# Patient Record
Sex: FEMALE | Race: WHITE | Employment: OTHER | URBAN - METROPOLITAN AREA
[De-identification: names, ages, dates, MRNs, and addresses within clinical notes are randomized per-mention and may not be internally consistent; named-entity substitution may affect disease eponyms.]

---

## 2018-10-27 ENCOUNTER — OFFICE VISIT (OUTPATIENT)
Dept: URGENT CARE | Facility: RETAIL CLINIC | Age: 38
End: 2018-10-27
Payer: COMMERCIAL

## 2018-10-27 VITALS
DIASTOLIC BLOOD PRESSURE: 66 MMHG | OXYGEN SATURATION: 95 % | HEART RATE: 84 BPM | TEMPERATURE: 98.4 F | SYSTOLIC BLOOD PRESSURE: 98 MMHG

## 2018-10-27 DIAGNOSIS — J45.31 MILD PERSISTENT ASTHMA WITH EXACERBATION: Primary | ICD-10-CM

## 2018-10-27 DIAGNOSIS — J30.2 SEASONAL ALLERGIC RHINITIS, UNSPECIFIED TRIGGER: ICD-10-CM

## 2018-10-27 PROCEDURE — 99203 OFFICE O/P NEW LOW 30 MIN: CPT | Performed by: PHYSICIAN ASSISTANT

## 2018-10-27 RX ORDER — ALBUTEROL SULFATE 0.83 MG/ML
2.5 SOLUTION RESPIRATORY (INHALATION) EVERY 4 HOURS PRN
Qty: 25 VIAL | Refills: 0 | Status: SHIPPED | OUTPATIENT
Start: 2018-10-27

## 2018-10-27 RX ORDER — PREDNISONE 20 MG/1
40 TABLET ORAL DAILY
Qty: 10 TABLET | Refills: 0 | Status: SHIPPED | OUTPATIENT
Start: 2018-10-27 | End: 2019-02-10

## 2018-10-27 RX ORDER — ALBUTEROL SULFATE 90 UG/1
2 AEROSOL, METERED RESPIRATORY (INHALATION) EVERY 4 HOURS PRN
Qty: 1 INHALER | Refills: 0 | Status: SHIPPED | OUTPATIENT
Start: 2018-10-27

## 2018-10-27 NOTE — PROGRESS NOTES
Chief Complaint   Patient presents with     Asthma     began 3 weeks ago with cough, sneezing, congestion; breathing is labored, coughing; pt describes as seasonal asthma, needs albuterol and symbicort     Sinus Problem     3 weeks     SUBJECTIVE:  Michell Kamara is a 38 year old female who presents to the clinic today with a chief complaint of cough, shortness of breath. and wheezing for 3 weeks. She has asthma and bronchiectasis and uses Advair daily and Albuterol as needed. Recently she has been needing her albuterol several times a day.   Her cough is described as wheezing.  She states her cough is worsening but the sinus pressure has been intermittent, waxing and waning.  Associated symptoms include sneezing, post nasal drip.  The patient's symptoms are exacerbated by no particular triggers.  Patient has been using old left over Amoxicillin for the last 2 days to improve symptoms.  Predisposing factors include: seasonal allergies and HX of asthma.    Past Medical History:   Diagnosis Date     Asthma      Sinusitis      Current Outpatient Prescriptions   Medication Sig Dispense Refill     albuterol (2.5 MG/3ML) 0.083% neb solution Take 1 vial (2.5 mg) by nebulization every 4 hours as needed for shortness of breath / dyspnea or wheezing 25 vial 0     albuterol (PROAIR HFA/PROVENTIL HFA/VENTOLIN HFA) 108 (90 Base) MCG/ACT inhaler Inhale 2 puffs into the lungs every 4 hours as needed for shortness of breath / dyspnea or wheezing 1 Inhaler 0     ALBUTEROL IN Inhale  into the lungs at onset of headache. UNKNOWN DOSE          Ascorbic Acid (VITAMIN C PO) Take 500 mg by mouth daily.         Budesonide-Formoterol Fumarate (SYMBICORT IN)        FISH OIL Take  by mouth daily. Take 2000mg - 4000 mg prn        Mometasone Furoate (NASONEX NA)        Multiple Vitamin (MULTIVITAMIN OR) Take 1 tablet by mouth daily.         predniSONE (DELTASONE) 20 MG tablet Take 2 tablets (40 mg) by mouth daily for 5 days 10 tablet 0      UNABLE TO FIND MEDICATION NAME: Tumeric       VITAMIN D, CHOLECALCIFEROL, PO Take by mouth daily       oxyCODONE-acetaminophen (PERCOCET) 5-325 MG per tablet Take 1-2 tablets by mouth every 4 hours as needed for pain. (Patient not taking: Reported on 10/27/2018) 15 tablet 0     Social History   Substance Use Topics     Smoking status: Never Smoker     Smokeless tobacco: Not on file     Alcohol use Yes      Comment: rare     Allergies   Allergen Reactions     Seasonal Allergies      REVIEW OF SYSTEMS  General: NEGATIVE for fever, chills, headaches, malaise.  ENT: POSITIVE for some nasal congestion, sneezing. NEGATIVE for sore throat, ear pain, .  Resp: POSITIVE for cough, wheezing and SOB. NEGATIVE for sputum and hemoptysis.    OBJECTIVE:  BP 98/66 (BP Location: Left arm)  Pulse 84  Temp 98.4  F (36.9  C) (Oral)  SpO2 95%  GENERAL APPEARANCE: healthy, alert and in no distress  HEENT: PERRL, conjunctiva clear. Bilateral ear canals and TM's normal. Nose without erythematous or edematous turbinates. Posterior pharynx nonerythematous and without tonsillar hypertrophy or exudate.  NECK: supple, nontender, no lymphadenopathy  RESP: lungs clear to auscultation - no rales, rhonchi or wheezes. Breathing is comfortable, not labored and without use of accessory muscles.  CV: regular rates and rhythm, normal S1 S2, no murmur noted    ASSESSMENT:    ICD-10-CM    1. Mild persistent asthma with exacerbation J45.31 predniSONE (DELTASONE) 20 MG tablet     albuterol (PROAIR HFA/PROVENTIL HFA/VENTOLIN HFA) 108 (90 Base) MCG/ACT inhaler     albuterol (2.5 MG/3ML) 0.083% neb solution   2. Seasonal allergic rhinitis, unspecified trigger J30.2 predniSONE (DELTASONE) 20 MG tablet     albuterol (2.5 MG/3ML) 0.083% neb solution     PLAN:   Patient Instructions   Prednisone 40mg daily for 5 days.  Continue Nasonex nasal spray, 2 sprays in each nostril daily.  Use your Symbicort twice daily every day, even when you are feeling well.  No  indication for antibiotics discussed.   Take an antihistamine such as Claritin (loratadine), Zyrtec (cetirizine) or Allegra (fexofenadine) daily for allergy symptoms.  Drink plenty of fluids (warm fluids like tea or soup are soothing and reduce cough)  Honey may soothe your sore throat and help manage your cough- may take straight or in warm water with lemon juice.  Avoid smoke (cigarettes, bonfires, fireplace, wood burning stoves).  Take Tylenol or an NSAID such as ibuprofen or naproxen as needed for pain.  Delsym (dextromethorphan polistirex) is an over the counter cough medication that lasts 12 hours.   Mucinex or Robitussin (guiafenesin) thin mucus and may help it to loosen more quickly  Good handwashing is the best way to prevent spread of germs  Present to emergency room if you develop trouble breathing, swallowing or cough-up blood.  Follow up with your primary care provider if symptoms worsen or fail to improve as expected.  Follow up with primary care provider with any problems, questions or concerns or if symptoms worsen or fail to improve. Patient agreed to plan and verbalized understanding.    DOMITILA Crisostomo - [place]

## 2018-10-27 NOTE — PATIENT INSTRUCTIONS
Prednisone 40mg daily for 5 days.  Continue Nasonex nasal spray, 2 sprays in each nostril daily.  Use your Symbicort twice daily every day, even when you are feeling well.  No indication for antibiotics discussed.   Take an antihistamine such as Claritin (loratadine), Zyrtec (cetirizine) or Allegra (fexofenadine) daily for allergy symptoms.  Drink plenty of fluids (warm fluids like tea or soup are soothing and reduce cough)  Honey may soothe your sore throat and help manage your cough- may take straight or in warm water with lemon juice.  Avoid smoke (cigarettes, bonfires, fireplace, wood burning stoves).  Take Tylenol or an NSAID such as ibuprofen or naproxen as needed for pain.  Delsym (dextromethorphan polistirex) is an over the counter cough medication that lasts 12 hours.   Mucinex or Robitussin (guiafenesin) thin mucus and may help it to loosen more quickly  Good handwashing is the best way to prevent spread of germs  Present to emergency room if you develop trouble breathing, swallowing or cough-up blood.  Follow up with your primary care provider if symptoms worsen or fail to improve as expected.

## 2018-10-27 NOTE — MR AVS SNAPSHOT
After Visit Summary   10/27/2018    Michell Kamara    MRN: [id number]           Patient Information     Date Of Birth          1980        Visit Information        Provider Department      10/27/2018 1:40 PM Olivia Louis PA-C [facility]        Today's Diagnoses     Mild persistent asthma with exacerbation    -  1    Seasonal allergic rhinitis, unspecified trigger          Care Instructions    Prednisone 40mg daily for 5 days.  Continue Nasonex nasal spray, 2 sprays in each nostril daily.  Use your Symbicort twice daily every day, even when you are feeling well.  No indication for antibiotics discussed.   Take an antihistamine such as Claritin (loratadine), Zyrtec (cetirizine) or Allegra (fexofenadine) daily for allergy symptoms.  Drink plenty of fluids (warm fluids like tea or soup are soothing and reduce cough)  Honey may soothe your sore throat and help manage your cough- may take straight or in warm water with lemon juice.  Avoid smoke (cigarettes, bonfires, fireplace, wood burning stoves).  Take Tylenol or an NSAID such as ibuprofen or naproxen as needed for pain.  Delsym (dextromethorphan polistirex) is an over the counter cough medication that lasts 12 hours.   Mucinex or Robitussin (guiafenesin) thin mucus and may help it to loosen more quickly  Good handwashing is the best way to prevent spread of germs  Present to emergency room if you develop trouble breathing, swallowing or cough-up blood.  Follow up with your primary care provider if symptoms worsen or fail to improve as expected.          Follow-ups after your visit        Who to contact     You can reach your care team any time of the day by calling [phone].  Notification of test results:  If you have an abnormal lab result, we will notify you by phone as soon as possible.         Additional Information About Your Visit        MyChart Information     [employer] lets you send messages to your doctor,  "view your test results, renew your prescriptions, schedule appointments and more. To sign up, go to [email or web address] . Click on \"Log in\" on the left side of the screen, which will take you to the Welcome page. Then click on \"Sign up Now\" on the right side of the page.     You will be asked to enter the access code listed below, as well as some personal information. Please follow the directions to create your username and password.     Your access code is: [email or web address]  Expires: 2019  2:22 PM     Your access code will  in 90 days. If you need help or a new code, please call your [place] clinic or [phone].        Care EveryWhere ID     This is your Care EveryWhere ID. This could be used by other organizations to access your [place] medical records  [id number]        Your Vitals Were     Pulse Temperature Pulse Oximetry             84 98.4  F (36.9  C) (Oral) 95%          Blood Pressure from Last 3 Encounters:   10/27/18 98/66   10/12/12 109/69   11 109/79    Weight from Last 3 Encounters:   10/12/12 145 lb 12.8 oz (66.1 kg)              Today, you had the following     No orders found for display         Today's Medication Changes          These changes are accurate as of 10/27/18  2:22 PM.  If you have any questions, ask your nurse or doctor.               Start taking these medicines.        Dose/Directions    * albuterol 108 (90 Base) MCG/ACT inhaler   Commonly known as:  PROAIR HFA/PROVENTIL HFA/VENTOLIN HFA   Used for:  Mild persistent asthma with exacerbation        Dose:  2 puff   Inhale 2 puffs into the lungs every 4 hours as needed for shortness of breath / dyspnea or wheezing   Quantity:  1 Inhaler   Refills:  0       * albuterol (2.5 MG/3ML) 0.083% neb solution   Used for:  Mild persistent asthma with exacerbation, Seasonal allergic rhinitis, unspecified trigger        Dose:  2.5 mg   Take 1 vial (2.5 mg) by nebulization every 4 hours as needed for shortness of breath / " dyspnea or wheezing   Quantity:  25 vial   Refills:  0       predniSONE 20 MG tablet   Commonly known as:  DELTASONE   Used for:  Mild persistent asthma with exacerbation, Seasonal allergic rhinitis, unspecified trigger        Dose:  40 mg   Take 2 tablets (40 mg) by mouth daily for 5 days   Quantity:  10 tablet   Refills:  0       * Notice:  This list has 2 medication(s) that are the same as other medications prescribed for you. Read the directions carefully, and ask your doctor or other care provider to review them with you.      Stop taking these medicines if you haven't already. Please contact your care team if you have questions.     ROMEO DAS IN                Where to get your medicines      These medications were sent to [facility] [address] [place] [address] [facility] AT [facility] [address]  [address] [facility], [facility] [address]     Phone:  [phone]     albuterol (2.5 MG/3ML) 0.083% neb solution    albuterol 108 (90 Base) MCG/ACT inhaler    predniSONE 20 MG tablet                Primary Care Provider Office Phone # Fax #    Venus Taylor, [facility] [phone]       Formerly [facility] [address]   [facility] [address]        Equal Access to Services     LEANNA SAM AH: Hadii ashley jenkins hadasho Soomaali, waaxda luqadaha, qaybta kaalmada adeegyada, anirudh garcia. So [facility] [phone].    ATENCIÓN: Si habla español, tiene a alicea disposición servicios gratuitos de asistencia lingüística. Jagjit al [phone].    We comply with applicable federal civil rights laws and [place] laws. We do not discriminate on the basis of race, color, national origin, age, disability, sex, sexual orientation, or gender identity.            Thank you!     Thank you for choosing [facility]  for your care. Our goal is always to provide you with excellent care. Hearing back from our patients is one way we can continue to improve  our services. Please take a few minutes to complete the written survey that you may receive in the mail after your visit with us. Thank you!             Your Updated Medication List - Protect others around you: Learn how to safely use, store and throw away your medicines at www.disposemymeds.org.          This list is accurate as of 10/27/18  2:22 PM.  Always use your most recent med list.                   Brand Name Dispense Instructions for use Diagnosis    * albuterol 108 (90 Base) MCG/ACT inhaler    PROAIR HFA/PROVENTIL HFA/VENTOLIN HFA    1 Inhaler    Inhale 2 puffs into the lungs every 4 hours as needed for shortness of breath / dyspnea or wheezing    Mild persistent asthma with exacerbation       * albuterol (2.5 MG/3ML) 0.083% neb solution     25 vial    Take 1 vial (2.5 mg) by nebulization every 4 hours as needed for shortness of breath / dyspnea or wheezing    Mild persistent asthma with exacerbation, Seasonal allergic rhinitis, unspecified trigger       ALBUTEROL IN      Inhale  into the lungs at onset of headache. UNKNOWN DOSE        FISH OIL      Take  by mouth daily. Take 2000mg - 4000 mg prn        MULTIVITAMIN PO      Take 1 tablet by mouth daily.        NASONEX NA           oxyCODONE-acetaminophen 5-325 MG per tablet    PERCOCET    15 tablet    Take 1-2 tablets by mouth every 4 hours as needed for pain.    S/P sinus surgery       predniSONE 20 MG tablet    DELTASONE    10 tablet    Take 2 tablets (40 mg) by mouth daily for 5 days    Mild persistent asthma with exacerbation, Seasonal allergic rhinitis, unspecified trigger       SYMBICORT IN           UNABLE TO FIND      MEDICATION NAME: Tumeric        VITAMIN C PO      Take 500 mg by mouth daily.        VITAMIN D (CHOLECALCIFEROL) PO      Take by mouth daily        * Notice:  This list has 2 medication(s) that are the same as other medications prescribed for you. Read the directions carefully, and ask your doctor or other care provider to review  them with you.

## 2019-02-10 ENCOUNTER — OFFICE VISIT (OUTPATIENT)
Dept: URGENT CARE | Facility: RETAIL CLINIC | Age: 39
End: 2019-02-10
Payer: COMMERCIAL

## 2019-02-10 VITALS
HEART RATE: 97 BPM | OXYGEN SATURATION: 97 % | DIASTOLIC BLOOD PRESSURE: 62 MMHG | SYSTOLIC BLOOD PRESSURE: 102 MMHG | TEMPERATURE: 97.9 F

## 2019-02-10 DIAGNOSIS — J20.9 ACUTE BRONCHITIS WITH COEXISTING CONDITION REQUIRING PROPHYLACTIC TREATMENT: Primary | ICD-10-CM

## 2019-02-10 DIAGNOSIS — J01.90 ACUTE SINUSITIS WITH COEXISTING CONDITION, NEED PROPHYLACTIC TREATMENT: ICD-10-CM

## 2019-02-10 DIAGNOSIS — J47.9 BRONCHIECTASIS WITHOUT ACUTE EXACERBATION (H): ICD-10-CM

## 2019-02-10 PROCEDURE — 99214 OFFICE O/P EST MOD 30 MIN: CPT | Performed by: FAMILY MEDICINE

## 2019-02-10 RX ORDER — PREDNISONE 20 MG/1
TABLET ORAL
Qty: 15 TABLET | Refills: 0 | Status: SHIPPED | OUTPATIENT
Start: 2019-02-10

## 2019-02-10 RX ORDER — CEFUROXIME AXETIL 500 MG/1
500 TABLET ORAL 2 TIMES DAILY
Qty: 20 TABLET | Refills: 0 | Status: SHIPPED | OUTPATIENT
Start: 2019-02-10 | End: 2019-02-20

## 2019-02-10 NOTE — PROGRESS NOTES
SUBJECTIVE:  Michell Kamara is a 39 year old female who presents to the clinic today with a chief complaint of cough  and wheezing. for 2 month(s).  Her cough is described as persistent, productive of yellow sputum and wheezing.    The patient's symptoms are moderate and worsening.  Associated symptoms include congestion, nasal congestion and rhinorrhea. The patient's symptoms are exacerbated by exercise and lying down  Patient has been using albuterol nebs and inhaler  to improve symptoms.    Past Medical History:   Diagnosis Date     Asthma      Sinusitis      Current Outpatient Medications   Medication Sig Dispense Refill     albuterol (2.5 MG/3ML) 0.083% neb solution Take 1 vial (2.5 mg) by nebulization every 4 hours as needed for shortness of breath / dyspnea or wheezing 25 vial 0     Budesonide-Formoterol Fumarate (SYMBICORT IN)        cefuroxime (CEFTIN) 500 MG tablet Take 1 tablet (500 mg) by mouth 2 times daily for 10 days 20 tablet 0     predniSONE (DELTASONE) 20 MG tablet 2 daily for 5 days. 1 daily for 5 days. 15 tablet 0     UNABLE TO FIND MEDICATION NAME: Tumeric       VITAMIN D, CHOLECALCIFEROL, PO Take by mouth daily       albuterol (PROAIR HFA/PROVENTIL HFA/VENTOLIN HFA) 108 (90 Base) MCG/ACT inhaler Inhale 2 puffs into the lungs every 4 hours as needed for shortness of breath / dyspnea or wheezing (Patient not taking: Reported on 2/10/2019) 1 Inhaler 0     ALBUTEROL IN Inhale  into the lungs at onset of headache. UNKNOWN DOSE          Ascorbic Acid (VITAMIN C PO) Take 500 mg by mouth daily.         FISH OIL Take  by mouth daily. Take 2000mg - 4000 mg prn        Mometasone Furoate (NASONEX NA)        Multiple Vitamin (MULTIVITAMIN OR) Take 1 tablet by mouth daily.         History   Smoking Status     Never Smoker   Smokeless Tobacco     Not on file       ROS  Review of systems negative except as stated above.    OBJECTIVE:  /62 (BP Location: Left arm)   Pulse 97   Temp 97.9  F (36.6  C)  (Tympanic)   SpO2 97%   GENERAL APPEARANCE: mild distress  EYES: EOMI,  PERRL, conjunctiva clear  HENT: ear canals and TM's normal.  Nose and mouth without ulcers, erythema or lesions  NECK: supple, nontender, no lymphadenopathy  RESP: expiratory wheezes throughout  CV: regular rates and rhythm, normal S1 S2, no murmur noted  ABDOMEN:  soft, nontender, no HSM or masses and bowel sounds normal  NEURO: Normal strength and tone, sensory exam grossly normal,  normal speech and mentation  SKIN: no suspicious lesions or rashes    ASSESSMENT:    COPD exacerbation  Rhinitis  Chronic pansinusitis  Bronchiectasis      PLAN:  Ceftin 500 mg bid for ten days.  Prednisone.  This has worked well in past.    Has records from [place].  Suggest establish contact with both Dr Oh and Juan Carlos.  Symptomatic measures encouraged, humidified air, plenty of fluids.  Follow up with primary care provider if no improvement.

## 2021-06-24 ENCOUNTER — RECORDS - HEALTHEAST (OUTPATIENT)
Dept: ADMINISTRATIVE | Facility: CLINIC | Age: 41
End: 2021-06-24